# Patient Record
Sex: MALE | Race: WHITE | NOT HISPANIC OR LATINO | Employment: UNEMPLOYED | URBAN - METROPOLITAN AREA
[De-identification: names, ages, dates, MRNs, and addresses within clinical notes are randomized per-mention and may not be internally consistent; named-entity substitution may affect disease eponyms.]

---

## 2017-05-03 ENCOUNTER — HOSPITAL ENCOUNTER (INPATIENT)
Facility: HOSPITAL | Age: 32
LOS: 1 days | Discharge: HOME | DRG: 897 | End: 2017-05-05
Attending: EMERGENCY MEDICINE | Admitting: HOSPITALIST
Payer: MEDICAID

## 2017-05-03 ENCOUNTER — NURSE TRIAGE (OUTPATIENT)
Dept: ADMINISTRATIVE | Facility: CLINIC | Age: 32
End: 2017-05-03

## 2017-05-03 DIAGNOSIS — F10.930 ALCOHOL WITHDRAWAL, UNCOMPLICATED: Primary | ICD-10-CM

## 2017-05-03 LAB
ALBUMIN SERPL BCP-MCNC: 4.7 G/DL
ALP SERPL-CCNC: 82 U/L
ALT SERPL W/O P-5'-P-CCNC: 241 U/L
AMPHET+METHAMPHET UR QL: NEGATIVE
ANION GAP SERPL CALC-SCNC: 16 MMOL/L
APAP SERPL-MCNC: <3 UG/ML
AST SERPL-CCNC: 166 U/L
BARBITURATES UR QL SCN>200 NG/ML: NEGATIVE
BASOPHILS # BLD AUTO: 0.03 K/UL
BASOPHILS NFR BLD: 0.6 %
BENZODIAZ UR QL SCN>200 NG/ML: NEGATIVE
BILIRUB SERPL-MCNC: 0.5 MG/DL
BILIRUB UR QL STRIP: NEGATIVE
BUN SERPL-MCNC: 16 MG/DL
BZE UR QL SCN: NEGATIVE
CALCIUM SERPL-MCNC: 9.5 MG/DL
CANNABINOIDS UR QL SCN: NEGATIVE
CHLORIDE SERPL-SCNC: 102 MMOL/L
CLARITY UR: CLEAR
CO2 SERPL-SCNC: 20 MMOL/L
COLOR UR: YELLOW
CREAT SERPL-MCNC: 0.8 MG/DL
CREAT UR-MCNC: 43.7 MG/DL
DIFFERENTIAL METHOD: ABNORMAL
EOSINOPHIL # BLD AUTO: 0.2 K/UL
EOSINOPHIL NFR BLD: 3.6 %
ERYTHROCYTE [DISTWIDTH] IN BLOOD BY AUTOMATED COUNT: 12.4 %
EST. GFR  (AFRICAN AMERICAN): >60 ML/MIN/1.73 M^2
EST. GFR  (NON AFRICAN AMERICAN): >60 ML/MIN/1.73 M^2
ETHANOL SERPL-MCNC: 204 MG/DL
GLUCOSE SERPL-MCNC: 87 MG/DL
GLUCOSE UR QL STRIP: NEGATIVE
HCT VFR BLD AUTO: 44.7 %
HGB BLD-MCNC: 15.8 G/DL
HGB UR QL STRIP: NEGATIVE
KETONES UR QL STRIP: NEGATIVE
LEUKOCYTE ESTERASE UR QL STRIP: NEGATIVE
LYMPHOCYTES # BLD AUTO: 1.9 K/UL
LYMPHOCYTES NFR BLD: 37.4 %
MAGNESIUM SERPL-MCNC: 2.6 MG/DL
MCH RBC QN AUTO: 33.5 PG
MCHC RBC AUTO-ENTMCNC: 35.3 %
MCV RBC AUTO: 95 FL
METHADONE UR QL SCN>300 NG/ML: NEGATIVE
MONOCYTES # BLD AUTO: 0.6 K/UL
MONOCYTES NFR BLD: 11.7 %
NEUTROPHILS # BLD AUTO: 2.4 K/UL
NEUTROPHILS NFR BLD: 46.5 %
NITRITE UR QL STRIP: NEGATIVE
OPIATES UR QL SCN: NEGATIVE
PCP UR QL SCN>25 NG/ML: NEGATIVE
PH UR STRIP: 6 [PH] (ref 5–8)
PLATELET # BLD AUTO: 211 K/UL
PMV BLD AUTO: 9.3 FL
POTASSIUM SERPL-SCNC: 4.3 MMOL/L
PROT SERPL-MCNC: 8 G/DL
PROT UR QL STRIP: NEGATIVE
RBC # BLD AUTO: 4.71 M/UL
SODIUM SERPL-SCNC: 138 MMOL/L
SP GR UR STRIP: 1.01 (ref 1–1.03)
TOXICOLOGY INFORMATION: NORMAL
TSH SERPL DL<=0.005 MIU/L-ACNC: 2.13 UIU/ML
URN SPEC COLLECT METH UR: NORMAL
UROBILINOGEN UR STRIP-ACNC: NEGATIVE EU/DL
WBC # BLD AUTO: 5.05 K/UL

## 2017-05-03 PROCEDURE — 12000002 HC ACUTE/MED SURGE SEMI-PRIVATE ROOM

## 2017-05-03 PROCEDURE — 80053 COMPREHEN METABOLIC PANEL: CPT

## 2017-05-03 PROCEDURE — 63600175 PHARM REV CODE 636 W HCPCS: Performed by: EMERGENCY MEDICINE

## 2017-05-03 PROCEDURE — 99284 EMERGENCY DEPT VISIT MOD MDM: CPT | Mod: 25

## 2017-05-03 PROCEDURE — 96374 THER/PROPH/DIAG INJ IV PUSH: CPT

## 2017-05-03 PROCEDURE — 81003 URINALYSIS AUTO W/O SCOPE: CPT

## 2017-05-03 PROCEDURE — 84443 ASSAY THYROID STIM HORMONE: CPT

## 2017-05-03 PROCEDURE — 80329 ANALGESICS NON-OPIOID 1 OR 2: CPT

## 2017-05-03 PROCEDURE — 96361 HYDRATE IV INFUSION ADD-ON: CPT

## 2017-05-03 PROCEDURE — 83735 ASSAY OF MAGNESIUM: CPT

## 2017-05-03 PROCEDURE — 85025 COMPLETE CBC W/AUTO DIFF WBC: CPT

## 2017-05-03 PROCEDURE — 25000003 PHARM REV CODE 250: Performed by: EMERGENCY MEDICINE

## 2017-05-03 PROCEDURE — 80320 DRUG SCREEN QUANTALCOHOLS: CPT

## 2017-05-03 PROCEDURE — 82570 ASSAY OF URINE CREATININE: CPT

## 2017-05-03 RX ORDER — DIAZEPAM 10 MG/2ML
10 INJECTION INTRAMUSCULAR
Status: COMPLETED | OUTPATIENT
Start: 2017-05-03 | End: 2017-05-04

## 2017-05-03 RX ORDER — LORAZEPAM 0.5 MG/1
2 TABLET ORAL EVERY 4 HOURS PRN
Status: DISCONTINUED | OUTPATIENT
Start: 2017-05-03 | End: 2017-05-04

## 2017-05-03 RX ADMIN — FOLIC ACID: 5 INJECTION, SOLUTION INTRAMUSCULAR; INTRAVENOUS; SUBCUTANEOUS at 09:05

## 2017-05-03 NOTE — TELEPHONE ENCOUNTER
Reason for Disposition   Feeling very shaky (i.e., visible tremors of hands)    Protocols used: ST ALCOHOL USE AND ABUSE AND [id number]    Brett drinks alcohol daily and recently has been having physical withdrawal symptoms. He states he has hand tremors when not drinking, but today he drank some to reduce the tremors and is still having those symptoms. He wants help and is asking can he seek help by going to the ED. Advised going to the emergency room is the appropriate recommendation, and that there are medications that can reduce withdrawal symptoms. Advised to have someone else bring him into ED since he does admit to drinking today.

## 2017-05-03 NOTE — IP AVS SNAPSHOT
[place] [address] [place] [address]  Phone: [phone]           Patient Discharge Instructions   Our goal is to set you up for success. This packet includes information on your condition, medications, and your home care.  It will help you care for yourself to prevent having to return to the hospital.     Please ask your nurse if you have any questions.      There are many details to remember when preparing to leave the hospital. Here is what you will need to do:    1. Take your medicine. If you are prescribed medications, review your Medication List on the following pages. You may have new medications to  at the pharmacy and others that you'll need to stop taking. Review the instructions for how and when to take your medications. Talk with your doctor or nurses if you are unsure of what to do.     2. Go to your follow-up appointments. Specific follow-up information is listed in the following pages. Your may be contacted by a nurse or clinical provider about future appointments. Be sure we have all of the phone numbers to reach you. Please contact your provider's office if you are unable to make an appointment.     3. Watch for warning signs. Your doctor or nurse will give you detailed warning signs to watch for and when to call for assistance. These instructions may also include educational information about your condition. If you experience any of warning signs to your health, call your doctor.           [facility]  Unless otherwise directed by your provider, please   contact [facility], our nurse care line   that is available for 24/7 assistance.     [phone] (toll-free)     Registered nurses in the [facility]   provide: appointment scheduling, clinical advisement, health education, and other advisory services.                  ** Verify the list of medication(s) below is accurate and up to date. Carry this with you in case of emergency.  If your medications have changed, please notify your healthcare provider.             Medication List      START taking these medications        Additional Info                      diazePAM 10 MG Tab   Commonly known as:  VALIUM   Quantity:  16 tablet   Refills:  0    Last time this was given:  10 mg on 5/5/2017  2:22 PM   Instructions:  Take 1 tab tid for 2 days, then 1 tab bid for 3 days, then 1 tab daily for 3 days, then 1/2 tab for 2 days, then stop.     Begin Date    AM    Noon    PM    Bedtime       ondansetron 8 MG Tbdl   Commonly known as:  ZOFRAN-ODT   Quantity:  15 tablet   Refills:  0   Dose:  8 mg    Instructions:  Take 1 tablet (8 mg total) by mouth every 12 (twelve) hours as needed.     Begin Date    AM    Noon    PM    Bedtime       tramadol 50 mg tablet   Commonly known as:  ULTRAM   Quantity:  15 tablet   Refills:  0   Dose:  50 mg    Last time this was given:  50 mg on 5/5/2017 12:13 PM   Instructions:  Take 1 tablet (50 mg total) by mouth every 6 (six) hours as needed.     Begin Date    AM    Noon    PM    Bedtime            Where to Get Your Medications      You can get these medications from any pharmacy     Bring a paper prescription for each of these medications     diazePAM 10 MG Tab    ondansetron 8 MG Tbdl    tramadol 50 mg tablet                  Please bring to all follow up appointments:    1. A copy of your discharge instructions.  2. All medicines you are currently taking in their original bottles.  3. Identification and insurance card.    Please arrive 15 minutes ahead of scheduled appointment time.    Please call 24 hours in advance if you must reschedule your appointment and/or time.        Your Scheduled Appointments     May 11, 2017  4:00 PM [facility]   Hospital Follow Up with [facility] ([facility])    [address] [facility] [address]  [facility] [address] [phone]                Discharge Instructions     Future Orders    Activity as  "tolerated     Call MD for:  difficulty breathing or increased cough     Call MD for:  increased confusion or weakness     Call MD for:  persistent dizziness, light-headedness, or visual disturbances     Call MD for:  persistent nausea and vomiting or diarrhea     Call MD for:  temperature >100.4     Diet general     Questions:    Total calories:      Fat restriction, if any:      Protein restriction, if any:      Na restriction, if any:      Fluid restriction:      Additional restrictions:          Discharge Instructions           Alcohol Abuse  Alcoholic drinks are harmful when you have too many of them. There is no set number of drinks that defines too much. Drinking that disrupts your life or your health is called alcohol abuse. Alcohol abuse can hurt your relationships with others. You may lose friends, a spouse, or even your job. You may be abusing alcohol if any of the following are true for you:  · Duties at home or with  suffer because of drinking.  · Duties at work or in school suffer because of drinking.  · You have missed work or school because of drinking.  · You use alcohol while driving or operating machinery.  · You have legal problems such as arrests due to drinking.  · You keep drinking even though it causes serious problems in your life.  Health effects  Alcohol abuse causes health problems. Sometimes this can happen after only drinking a little." There is no set number of drinks or amount of alcohol that defines too much. The more you drink at one time, and the more often you drink determine both the short-term and long-term health effects. It affects all parts of your body and your health, including your:  · Brain. Alcohol is a central nervous system depressant. It can damage parts of the brain that affect your balance, memory, thinking, and emotions. It can cause memory loss, blackouts, depression, agitation, sleep cycle changes, and seizures. These changes may or may not be " reversible.  · Heart and vascular system. Alcohol affects multiple areas. It can damage heart muscle causing cardiomyopathy, which is a weakening and stretching of the heart muscle. This can lead to trouble breathing, an irregular heartbeat, atrial fibrillation, leg swelling, and heart failure. Alcohol use makes the blood vessels stiffen causing high blood pressure. All of these problems increase your risk of having heart attacks or strokes.  · Liver. Alcohol causes fat to build up in the liver, affecting its normal function. This increases the risk for hepatitis, leading to abdominal pain, appetite loss, jaundice, bleeding problems, liver fibrosis, and cirrhosis. This, in turn, can affect your ability to fight off infections, and can cause diabetes. The liver changes prevent it from removing toxins in your blood that can cause encephalopathy, which may show with confusion, altered level of consciousness, personality changes, memory loss, seizures, coma, and death.  · Pancreas. Alcohol can cause inflammation of the pancreas, or pancreatitis. This can cause abdominal pain, fever, and diabetes.  · Immune system. Alcohol weakens your immune system in a number of ways. It suppresses your immune system making it harder to fight infections and colds. It also increases the chance of getting pneumonia and tuberculosis.  · Cancer. Alcohol is a risk factor for developing cancer of the mouth, esophagus, pharynx, larynx, liver, and breast.  · Sexual function. Alcohol can lead to sexual problems.  Home care  The following guidelines will help you deal with alcohol abuse:  · Admit you have a problem with alcohol.  · Ask for help from your healthcare provider and trusted family members or close friends.  · Get help from people trained in dealing with alcohol abuse. This may be individual counseling or group therapy, or it may be a supervised alcohol treatment program.  · Join a self-help group for alcohol abuse such as Alcoholics  "Anonymous (AA).  · Avoid people who abuse alcohol or tempt you to drink.  Follow-up care  Follow up as advised by the healthcare provider, or as advised. Contact these groups to get help:  · Alcoholics Anonymous (AA): Go to www.aa.org or check the phone book for meetings near you.  · National Alcohol and Substance Abuse Information Center (NASAIC): 231.713.8933 www.addictioncareGratafy.[employer]  · National [place] on Alcoholism and Drug Dependence (NCADD): 920-KKW-AMWG (180-8689) www.ncadd.org  Call 911  Call 911 if any of these occur:  · Trouble breathing or slow irregular breathing  · Chest pain  · Sudden weakness on one side of your body or sudden trouble speaking  · Heavy bleeding or vomiting blood  · Very drowsy or trouble awakening  · Fainting or loss of consciousness  · Rapid heart rate  · Seizure  When to seek medical care  Call your healthcare provider right away if any of these occur:   · Confusion  · Hallucinations (seeing, hearing, or feeling things that arent there)  · Pain in your upper abdomen that gets worse  · Repeated vomiting or black or tarry stools  · Severe shakiness  Date Last Reviewed: 6/1/2016  © 4927-3753 [employer]. [address] [place] [address]. All rights reserved. This information is not intended as a substitute for professional medical care. Always follow your healthcare professional's instructions.            Primary Diagnosis     Your primary diagnosis was:  Alcohol Withdrawal      Admission Information     Date & Time Provider Department CSN    5/3/2017  8:54 PM Mono Vizcarra MD [facility] [id number]      Care Providers     Provider Role Specialty Primary office phone    Mono Vizcarra MD Attending Provider Hospitalist [phone]      Your Vitals Were     BP Pulse Temp Resp Height Weight    129/62 (BP Location: Right arm, Patient Position: Lying, BP Method: Automatic) 93 98.1 °F (36.7 °C) (Oral) 17 6' 2" (1.88 m) 84.1 kg (185 lb 8 oz) "    SpO2 BMI             100% 23.82 kg/m2         Recent Lab Values     No lab values to display.      Allergies as of 5/5/2017        Reactions    Ciprofloxacin       Advance Directives     An advance directive is a document which, in the event you are no longer able to make decisions for yourself, tells your healthcare team what kind of treatment you do or do not want to receive, or who you would like to make those decisions for you.  If you do not currently have an advance directive, [facility] encourages you to create one.  For more information call:  [phone] ([phone]), [phone] ([phone]),  or log on to [email or web address].        Smoking Cessation     If you would like to quit smoking:   You may be eligible for free services if you are a [place] resident and started smoking cigarettes before September 1, 1988.  Call the Smoking Cessation Trust ([facility]) toll free at [phone] [phone] or [phone] 162-9175.   Call -458-QUIT-NOW if you do not meet the above criteria.   Contact us via email: [email or web address]   View our website for more information: [email or web address]        Language Assistance Services     ATTENTION: Language assistance services are available, free of charge. Please call [phone].      ATENCIÓN: Si habla español, tiene a barnes disposición servicios gratuitos de asistencia lingüística. Llame al [phone].     CHÚ Ý: N?u b?n nói Ti?ng Vi?t, có các d?ch v? h? tr? ngôn ng? mi?n phí dành cho b?n. G?i s? [phone].        [facility] Sign-Up     Activating your [facility] account is as easy as 1-2-3!     1) Visit [email or web address], select Sign Up Now, enter this activation code and your date of birth, then select Next.  [email or web address]  Expires: 6/19/2017  2:31 PM      2) Create a username and password to use when you visit [facility] in the future and select a security question in case you lose your password and select Next.    3) Enter your e-mail address and  click Sign Up!    Additional Information  If you have questions, please e-mail [email or web address] or call [phone] to talk to our [facility] staff. Remember, [facility] is NOT to be used for urgent needs. For medical emergencies, dial 911.          [facility] complies with applicable Federal civil rights laws and does not discriminate on the basis of race, color, national origin, age, disability, or sex.

## 2017-05-04 PROBLEM — F10.939 ALCOHOL WITHDRAWAL: Status: ACTIVE | Noted: 2017-05-04

## 2017-05-04 PROBLEM — F17.200 SMOKER: Chronic | Status: ACTIVE | Noted: 2017-05-04

## 2017-05-04 PROBLEM — F10.10 ALCOHOL ABUSE: Chronic | Status: ACTIVE | Noted: 2017-05-04

## 2017-05-04 PROBLEM — R79.89 ELEVATED LFTS: Status: ACTIVE | Noted: 2017-05-04

## 2017-05-04 LAB
ANION GAP SERPL CALC-SCNC: 12 MMOL/L
BUN SERPL-MCNC: 16 MG/DL
CALCIUM SERPL-MCNC: 8.5 MG/DL
CHLORIDE SERPL-SCNC: 105 MMOL/L
CO2 SERPL-SCNC: 23 MMOL/L
CREAT SERPL-MCNC: 0.8 MG/DL
EST. GFR  (AFRICAN AMERICAN): >60 ML/MIN/1.73 M^2
EST. GFR  (NON AFRICAN AMERICAN): >60 ML/MIN/1.73 M^2
GLUCOSE SERPL-MCNC: 82 MG/DL
POTASSIUM SERPL-SCNC: 3.6 MMOL/L
SODIUM SERPL-SCNC: 140 MMOL/L

## 2017-05-04 PROCEDURE — 63600175 PHARM REV CODE 636 W HCPCS: Performed by: EMERGENCY MEDICINE

## 2017-05-04 PROCEDURE — 25000003 PHARM REV CODE 250: Performed by: HOSPITALIST

## 2017-05-04 PROCEDURE — 36415 COLL VENOUS BLD VENIPUNCTURE: CPT

## 2017-05-04 PROCEDURE — 12000002 HC ACUTE/MED SURGE SEMI-PRIVATE ROOM

## 2017-05-04 PROCEDURE — 25000003 PHARM REV CODE 250: Performed by: EMERGENCY MEDICINE

## 2017-05-04 PROCEDURE — 80048 BASIC METABOLIC PNL TOTAL CA: CPT

## 2017-05-04 PROCEDURE — 63600175 PHARM REV CODE 636 W HCPCS: Performed by: HOSPITALIST

## 2017-05-04 RX ORDER — LORAZEPAM 2 MG/ML
2 INJECTION INTRAMUSCULAR EVERY 4 HOURS PRN
Status: DISCONTINUED | OUTPATIENT
Start: 2017-05-04 | End: 2017-05-05 | Stop reason: HOSPADM

## 2017-05-04 RX ORDER — DEXTROSE MONOHYDRATE AND SODIUM CHLORIDE 5; .9 G/100ML; G/100ML
INJECTION, SOLUTION INTRAVENOUS CONTINUOUS
Status: DISCONTINUED | OUTPATIENT
Start: 2017-05-04 | End: 2017-05-05 | Stop reason: HOSPADM

## 2017-05-04 RX ORDER — ACETAMINOPHEN 325 MG/1
650 TABLET ORAL EVERY 6 HOURS PRN
Status: DISCONTINUED | OUTPATIENT
Start: 2017-05-04 | End: 2017-05-05 | Stop reason: HOSPADM

## 2017-05-04 RX ORDER — ONDANSETRON 2 MG/ML
4 INJECTION INTRAMUSCULAR; INTRAVENOUS EVERY 12 HOURS PRN
Status: DISCONTINUED | OUTPATIENT
Start: 2017-05-04 | End: 2017-05-04

## 2017-05-04 RX ORDER — DIAZEPAM 5 MG/1
10 TABLET ORAL EVERY 8 HOURS
Status: DISCONTINUED | OUTPATIENT
Start: 2017-05-04 | End: 2017-05-05 | Stop reason: HOSPADM

## 2017-05-04 RX ORDER — IBUPROFEN 200 MG
1 TABLET ORAL DAILY
Status: DISCONTINUED | OUTPATIENT
Start: 2017-05-04 | End: 2017-05-05 | Stop reason: HOSPADM

## 2017-05-04 RX ORDER — ONDANSETRON 2 MG/ML
8 INJECTION INTRAMUSCULAR; INTRAVENOUS EVERY 8 HOURS PRN
Status: DISCONTINUED | OUTPATIENT
Start: 2017-05-04 | End: 2017-05-05 | Stop reason: HOSPADM

## 2017-05-04 RX ADMIN — ONDANSETRON 4 MG: 2 INJECTION INTRAMUSCULAR; INTRAVENOUS at 08:05

## 2017-05-04 RX ADMIN — DEXTROSE AND SODIUM CHLORIDE: 5; .9 INJECTION, SOLUTION INTRAVENOUS at 09:05

## 2017-05-04 RX ADMIN — LORAZEPAM 2 MG: 2 INJECTION INTRAMUSCULAR; INTRAVENOUS at 04:05

## 2017-05-04 RX ADMIN — DEXTROSE AND SODIUM CHLORIDE 1000 ML: 5; .9 INJECTION, SOLUTION INTRAVENOUS at 12:05

## 2017-05-04 RX ADMIN — DIAZEPAM 10 MG: 5 TABLET ORAL at 09:05

## 2017-05-04 RX ADMIN — DIAZEPAM 10 MG: 5 TABLET ORAL at 05:05

## 2017-05-04 RX ADMIN — DIAZEPAM 10 MG: 5 INJECTION, SOLUTION INTRAMUSCULAR; INTRAVENOUS at 12:05

## 2017-05-04 RX ADMIN — NICOTINE 1 PATCH: 21 PATCH, EXTENDED RELEASE TRANSDERMAL at 01:05

## 2017-05-04 RX ADMIN — FOLIC ACID: 5 INJECTION, SOLUTION INTRAMUSCULAR; INTRAVENOUS; SUBCUTANEOUS at 10:05

## 2017-05-04 RX ADMIN — DIAZEPAM 10 MG: 5 TABLET ORAL at 01:05

## 2017-05-04 RX ADMIN — LORAZEPAM 2 MG: 2 INJECTION INTRAMUSCULAR; INTRAVENOUS at 09:05

## 2017-05-04 RX ADMIN — ACETAMINOPHEN 650 MG: 325 TABLET, FILM COATED ORAL at 04:05

## 2017-05-04 NOTE — ED TRIAGE NOTES
Pt presents to Ed with c/o shaking and stating he is trying to detox and going through withdrawals . Pt also c/o nausea and chills.

## 2017-05-04 NOTE — ASSESSMENT & PLAN NOTE
Patient very interested in alcohol cessation and wants to pursue inpatient treatment.  SW consult.

## 2017-05-04 NOTE — H&P
"[facility] Hospital Medicine  History & Physical    Patient Name: Beau Leahy  MRN: [id number]  Admission Date: 5/3/2017  Attending Physician: Mono Vizcarra MD   Primary Care Provider: Primary Doctor No         Patient information was obtained from patient.     Subjective:     Principal Problem:Alcohol withdrawal    Chief Complaint:   Chief Complaint   Patient presents with    Delirium Tremens (DTS)     pt reports last drink 1 hour ago, "now shaking and wants inpatient help"        HPI: 30 y/o male with alcohol abuse presented to ER for alcohol detox.  Patient drinks whiskey and beers from the time he wakes up to when he goes to sleep.  Patient states that he wants to go into an inpatient detox program.  He apparently called an [facility] nurse and was told to come to ER for treatment.  Patient states that he knows how severe ETOH withdrawal could be, but he has never gone into withdrawal.  Patient stated that he felt shaky prior to presentation.  He also complains of constant nausea, but no vomiting.  Patient's last drink was around 15 hours ago.  Pt is seeking inpatient help and has contacted a few programs already.  He was in a inpatient program around 2 years ago.  Pt states, "I don't want to detox alone and I don't want to drink alcohol before I get into a program".  Pt denies any drug use.  No other complaints.    Past Medical History:   Diagnosis Date    Alcohol abuse        Past Surgical History:   Procedure Laterality Date    LEG SURGERY      TONSILLECTOMY         Review of patient's allergies indicates:   Allergen Reactions    Ciprofloxacin        No current facility-administered medications on file prior to encounter.      No current outpatient prescriptions on file prior to encounter.     Family History     Problem Relation (Age of Onset)    Arthritis Sister    Birth defects Sister    Cancer Mother        Social History Main Topics    Smoking status: Current Every Day " Smoker     Packs/day: 0.50     Years: 0.50    Smokeless tobacco: Not on file      Comment: pt in for etoh affraid it will over stimulate    Alcohol use 7.2 oz/week     12 Cans of beer per week      Comment: a fifth a day plus    Drug use: No    Sexual activity: Yes     Partners: Female     Review of Systems   Constitutional: Negative for fever and unexpected weight change.   HENT: Negative for ear discharge and ear pain.    Eyes: Negative for pain and itching.   Respiratory: Negative for cough and shortness of breath.    Cardiovascular: Negative for chest pain and palpitations.   Gastrointestinal: Positive for nausea. Negative for abdominal distention.   Endocrine: Negative for polyphagia and polyuria.   Genitourinary: Negative for difficulty urinating and dysuria.   Neurological: Positive for tremors. Negative for seizures.   Psychiatric/Behavioral: Negative for hallucinations. The patient is nervous/anxious.      Objective:     Vital Signs (Most Recent):  Temp: 98.1 °F (36.7 °C) (05/04/17 1255)  Pulse: 90 (05/04/17 1255)  Resp: 17 (05/04/17 1255)  BP: (!) 129/94 (05/04/17 1255)  SpO2: 100 % (05/04/17 1255) Vital Signs (24h Range):  Temp:  [98.1 °F (36.7 °C)-98.8 °F (37.1 °C)] 98.1 °F (36.7 °C)  Pulse:  [] 90  Resp:  [17-20] 17  SpO2:  [96 %-100 %] 100 %  BP: (121-138)/(71-94) 129/94     Weight: 84.1 kg (185 lb 8 oz)  Body mass index is 23.82 kg/(m^2).    Physical Exam   Constitutional: He is oriented to person, place, and time. He appears well-developed. No distress.   HENT:   Head: Normocephalic and atraumatic.   Eyes: Conjunctivae are normal. Right eye exhibits no discharge. Left eye exhibits no discharge.   Neck: Neck supple.   Cardiovascular: Normal rate, regular rhythm and normal heart sounds.    Pulmonary/Chest: Effort normal and breath sounds normal. No stridor.   Abdominal: Soft. Bowel sounds are normal.   Musculoskeletal: Normal range of motion. He exhibits no deformity.   Neurological: He is  alert and oriented to person, place, and time.   Skin: Skin is warm and dry.        Significant Labs:   CBC:   Recent Labs  Lab 05/03/17 2140   WBC 5.05   HGB 15.8   HCT 44.7        CMP:   Recent Labs  Lab 05/03/17 2140 05/04/17  0415    140   K 4.3 3.6    105   CO2 20* 23   GLU 87 82   BUN 16 16   CREATININE 0.8 0.8   CALCIUM 9.5 8.5*   PROT 8.0  --    ALBUMIN 4.7  --    BILITOT 0.5  --    ALKPHOS 82  --    *  --    *  --    ANIONGAP 16 12   EGFRNONAA >60 >60           Assessment/Plan:     * Alcohol withdrawal  Patient presented with slight tremors and slight tachycardia.  Currently looks comfortable with no obvious signs of DT's.  Hemodynamically stable.  Continue scheduled Valium with prn Ativan.  Daily Banana Bag.  Will discuss case with SW for disposition.      Alcohol abuse  Patient very interested in alcohol cessation and wants to pursue inpatient treatment.  SW consult.      Elevated LFTs  Secondary to ETOH abuse.  Rest of labs not consistent with active liver failure.      Smoker  Smoking cessation counseling.  Will place Nicotine patch.      VTE Risk Mitigation         Ordered     Low Risk of VTE  Once      05/04/17 0217        Mono Vizcarra MD  Department of Hospital Medicine   [facility]

## 2017-05-04 NOTE — PROGRESS NOTES
Patient with shaking noted. Complaints of pain all over especially in lower back. Ativan IV given.

## 2017-05-04 NOTE — PLAN OF CARE
"Problem: Patient Care Overview  Goal: Plan of Care Review  Outcome: Ongoing (interventions implemented as appropriate)  Patient has had shakes today. PO scheduled valium and prn ativan given and helped some with symptoms. Complaints of "all over" pain. Tylenol given. Patient had yellow bag of fluids and continues on IV fluids. Appetite poor, patient having nausea that is relieved with zofran.      "

## 2017-05-04 NOTE — SUBJECTIVE & OBJECTIVE
Past Medical History:   Diagnosis Date    Alcohol abuse        Past Surgical History:   Procedure Laterality Date    LEG SURGERY      TONSILLECTOMY         Review of patient's allergies indicates:   Allergen Reactions    Ciprofloxacin        No current facility-administered medications on file prior to encounter.      No current outpatient prescriptions on file prior to encounter.     Family History     Problem Relation (Age of Onset)    Arthritis Sister    Birth defects Sister    Cancer Mother        Social History Main Topics    Smoking status: Current Every Day Smoker     Packs/day: 0.50     Years: 0.50    Smokeless tobacco: Not on file      Comment: pt in for etoh affraid it will over stimulate    Alcohol use 7.2 oz/week     12 Cans of beer per week      Comment: a fifth a day plus    Drug use: No    Sexual activity: Yes     Partners: Female     Review of Systems   Constitutional: Negative for fever and unexpected weight change.   HENT: Negative for ear discharge and ear pain.    Eyes: Negative for pain and itching.   Respiratory: Negative for cough and shortness of breath.    Cardiovascular: Negative for chest pain and palpitations.   Gastrointestinal: Positive for nausea. Negative for abdominal distention.   Endocrine: Negative for polyphagia and polyuria.   Genitourinary: Negative for difficulty urinating and dysuria.   Neurological: Positive for tremors. Negative for seizures.   Psychiatric/Behavioral: Negative for hallucinations. The patient is nervous/anxious.      Objective:     Vital Signs (Most Recent):  Temp: 98.1 °F (36.7 °C) (05/04/17 1255)  Pulse: 90 (05/04/17 1255)  Resp: 17 (05/04/17 1255)  BP: (!) 129/94 (05/04/17 1255)  SpO2: 100 % (05/04/17 1255) Vital Signs (24h Range):  Temp:  [98.1 °F (36.7 °C)-98.8 °F (37.1 °C)] 98.1 °F (36.7 °C)  Pulse:  [] 90  Resp:  [17-20] 17  SpO2:  [96 %-100 %] 100 %  BP: (121-138)/(71-94) 129/94     Weight: 84.1 kg (185 lb 8 oz)  Body mass index is  23.82 kg/(m^2).    Physical Exam   Constitutional: He is oriented to person, place, and time. He appears well-developed. No distress.   HENT:   Head: Normocephalic and atraumatic.   Eyes: Conjunctivae are normal. Right eye exhibits no discharge. Left eye exhibits no discharge.   Neck: Neck supple.   Cardiovascular: Normal rate, regular rhythm and normal heart sounds.    Pulmonary/Chest: Effort normal and breath sounds normal. No stridor.   Abdominal: Soft. Bowel sounds are normal.   Musculoskeletal: Normal range of motion. He exhibits no deformity.   Neurological: He is alert and oriented to person, place, and time.   Skin: Skin is warm and dry.        Significant Labs:   CBC:   Recent Labs  Lab 05/03/17  2140   WBC 5.05   HGB 15.8   HCT 44.7        CMP:   Recent Labs  Lab 05/03/17  2140 05/04/17  0415    140   K 4.3 3.6    105   CO2 20* 23   GLU 87 82   BUN 16 16   CREATININE 0.8 0.8   CALCIUM 9.5 8.5*   PROT 8.0  --    ALBUMIN 4.7  --    BILITOT 0.5  --    ALKPHOS 82  --    *  --    *  --    ANIONGAP 16 12   EGFRNONAA >60 >60

## 2017-05-04 NOTE — ASSESSMENT & PLAN NOTE
Patient presented with slight tremors and slight tachycardia.  Currently looks comfortable with no obvious signs of DT's.  Hemodynamically stable.  Continue scheduled Valium with prn Ativan.  Daily Banana Bag.  Will discuss case with SW for disposition.

## 2017-05-04 NOTE — PLAN OF CARE
"SW met with patient to complete discharge needs assessment. SW provided and reviewed with patient "[facility] Packet", "Discharge Planning Begins on Admission" brochure and "Help At Home" handout. SW discussed with patient going to residential substance abuse treatment when discharged from the hospital. Patient informed SW he's went to treatment with [facility] in the past. Patient reports he's been in contact with a few treatment facilities in [facility]: [facility], [place], [facility], and [facility]. Patient reports that he's still thinking about where he would like to go. SW informed patient if he need any assistance with the admission process or clinicals sent on his behalf to contact RITA.  RITA also discussed patient following up with [facility]. SW explained the benefits and advantages of going to [facility], patient in agreement with following up. Patient prefers afternoon doctor appointments.      [employer] [id number] [place] [address] [place] - [address] [facility]  [address]  [facility] [address]  Phone: [phone] Fax: [phone]         05/04/17 1517   Discharge Assessment   Assessment Type Discharge Planning Assessment   Confirmed/corrected address and phone number on facesheet? Yes   Assessment information obtained from? Patient   Type of Healthcare Directive Received (None)   Prior to hospitilization cognitive status: Alert/Oriented;No Deficits   Prior to hospitalization functional status: Independent   Current cognitive status: Alert/Oriented;No Deficits   Current Functional Status: Independent   Arrived From home or self-care   Lives With alone   Able to Return to Prior Arrangements yes   Is patient able to care for self after discharge? Yes   How many people do you have in your home that can help with your care after discharge? 0   Who are your caregiver(s) and their phone number(s)? "I don't have no one to help me, " "no family or friends".   Patient's perception of discharge disposition other (comments)  (Residential Substance Abuse Treatment)   Readmission Within The Last 30 Days no previous admission in last 30 days   Patient currently being followed by outpatient case management? No   Patient currently receives home health services? No   Does the patient currently use HME? No   Patient currently receives private duty nursing? No   Patient currently receives any other outside agency services? No   Equipment Currently Used at Home none   Do you have any problems affording any of your prescribed medications? No   Do you have any financial concerns preventing you from receiving the healthcare you need? No   Does the patient have transportation to healthcare appointments? Yes   Transportation Available car  (Patient reports car is in the hospital parking lot)   Does the patient receive services at the [facility]? No   Are there any open cases? No   Discharge Plan A Other  (Inpatient Residential Treatment)   Discharge Plan B Home   Patient/Family In Agreement With Plan yes     "

## 2017-05-04 NOTE — ED PROVIDER NOTES
"Encounter Date: 5/3/2017    SCRIBE #1 NOTE: I, Epifanio Encarnacionuyen , am scribing for, and in the presence of,  Galileo Latif MD . I have scribed the following portions of the note - Other sections scribed: HPI, ROS .       History     Chief Complaint   Patient presents with    Delirium Tremens (DTS)     pt reports last drink 1 hour ago, "now shaking and wants inpatient help"     Review of patient's allergies indicates:   Allergen Reactions    Ciprofloxacin      HPI Comments: CC: Delirium Tremens     HPI: This 31 y.o. Male smoker presents to the ED c/o chills, coughs, blood in stool, nausea, vomiting, hematuria, lower back pain, and generalized muscles spasms attributed to alcohol withdrawal derlirium. Symptoms are acute in onset. Pt reports last drink was 1 hour ago, typically drinks whiskey and beer. Pt states, "I know my withdrawal symptoms will be worse in the morning and I do not want to go through it alone." Pt is seeking inpatient help and has contacted a few programs already. Pt states, "I want to get into an inpatient program and I've spoken to a few. I don't want to detox alone and I don't want to drink alcohol before I get into a program." Pt denies any daily medication. Pt denies any drug use. Pt denies fever, eye pain, shortness of breath, abdominal pain, leg pain, arm pain, vomiting, dysuria, rash, headaches, or any other associated symptoms. Pt has no modifying factors. Pt has no prior treatment.         The history is provided by the patient. No  was used.     Past Medical History:   Diagnosis Date    Alcohol abuse      Past Surgical History:   Procedure Laterality Date    LEG SURGERY      TONSILLECTOMY       Family History   Problem Relation Age of Onset    Cancer Mother     Arthritis Sister     Birth defects Sister      Social History   Substance Use Topics    Smoking status: Current Every Day Smoker     Packs/day: 0.50     Years: 0.50    Smokeless tobacco: None      " Comment: pt in for etoh affraid it will over stimulate    Alcohol use 7.2 oz/week     12 Cans of beer per week      Comment: a fifth a day plus     Review of Systems   Constitutional: Positive for chills. Negative for fever.        (+) generalized muscle spasms    HENT: Negative for sore throat.    Eyes: Negative for pain.   Respiratory: Positive for cough. Negative for shortness of breath.    Cardiovascular: Negative for chest pain.   Gastrointestinal: Positive for blood in stool, diarrhea and nausea. Negative for abdominal pain and vomiting.   Genitourinary: Positive for hematuria. Negative for dysuria.   Musculoskeletal: Positive for back pain (lower). Negative for myalgias (legs, arms).   Skin: Negative for rash.   Neurological: Negative for weakness.       Physical Exam   Initial Vitals   BP Pulse Resp Temp SpO2   05/03/17 2048 05/03/17 2048 05/03/17 2048 05/03/17 2048 05/03/17 2048   138/76 111 20 98.1 °F (36.7 °C) 98 %     Physical Exam  The patient was examined specifically for the following:   General:No significant distress, Good color, Warm and dry. Head and neck:Scalp atraumatic, Neck supple. Neurological:Appropriate conversation, Gross motor deficits. Eyes:Conjugate gaze, Clear corneas. ENT: No epistaxis. Cardiac: Regular rate and rhythm, Grossly normal heart tones. Pulmonary: Wheezing, Rales. Gastrointestinal: Abdominal tenderness, Abdominal distention. Musculoskeletal: Extremity deformity, Apparent pain with range of motion of the joints. Skin: Rash.   The findings on examination were normal except for the following: The patient is tremulous tachycardic.  Lungs are clear.  Wheezing rales of the rhonchi.  Heart tones are normal.  The patient is regular rate and rhythm.  The abdomen is nontender.  There is no guarding rebound mass or distention.  Extremities are nontender.  There is no pain with range of motion of any joints.  The patient has no rash.  ED Course   Procedures  Labs Reviewed    COMPREHENSIVE METABOLIC PANEL - Abnormal; Notable for the following:        Result Value    CO2 20 (*)      (*)      (*)     All other components within normal limits   CBC W/ AUTO DIFFERENTIAL - Abnormal; Notable for the following:     MCH 33.5 (*)     All other components within normal limits   ALCOHOL,MEDICAL (ETHANOL) - Abnormal; Notable for the following:     Alcohol, Medical, Serum 204 (*)     All other components within normal limits   ACETAMINOPHEN LEVEL - Abnormal; Notable for the following:     Acetaminophen (Tylenol), Serum <3.0 (*)     All other components within normal limits   MAGNESIUM   TSH   URINALYSIS   DRUG SCREEN PANEL, URINE EMERGENCY         Medical decision-making: This patient presents to the emergency room with a history of alcoholism.  He is drinking a fifth of whiskey a day plus beer.  He would like to get off of alcohol.  He is tachycardic and tremulous.  I believe he is in early withdrawal.  He has been drinking today.  I discussed this case with Dr. Saeed we will admit him for benzodiazepine therapy.  There is no hypomagnesemia.  He has some mild liver enzyme elevations consistent with alcoholic liver disease.  His alcohol level was 204.  I expect she will get worse as his alcohol level falls.  I will treat him with benzodiazepines and admit him to internal medicine.  I collaborated on the orders with Dr. Saeed.                  Scribe Attestation:   Scribe #1: I performed the above scribed service and the documentation accurately describes the services I performed. I attest to the accuracy of the note.    Attending Attestation:           Physician Attestation for Scribe:  Physician Attestation Statement for Scribe #1: I, Galileo Latif MD , reviewed documentation, as scribed by Epifanio Gifford  in my presence, and it is both accurate and complete.                 ED Course     Clinical Impression:   The encounter diagnosis was Alcohol withdrawal, uncomplicated.           Galileo Latif MD  05/04/17 0414

## 2017-05-05 VITALS
HEART RATE: 93 BPM | DIASTOLIC BLOOD PRESSURE: 62 MMHG | HEIGHT: 74 IN | OXYGEN SATURATION: 100 % | WEIGHT: 185.5 LBS | BODY MASS INDEX: 23.81 KG/M2 | TEMPERATURE: 98 F | RESPIRATION RATE: 17 BRPM | SYSTOLIC BLOOD PRESSURE: 129 MMHG

## 2017-05-05 LAB
ANION GAP SERPL CALC-SCNC: 7 MMOL/L
BUN SERPL-MCNC: 12 MG/DL
CALCIUM SERPL-MCNC: 8.8 MG/DL
CHLORIDE SERPL-SCNC: 104 MMOL/L
CO2 SERPL-SCNC: 25 MMOL/L
CREAT SERPL-MCNC: 0.8 MG/DL
EST. GFR  (AFRICAN AMERICAN): >60 ML/MIN/1.73 M^2
EST. GFR  (NON AFRICAN AMERICAN): >60 ML/MIN/1.73 M^2
GLUCOSE SERPL-MCNC: 89 MG/DL
POTASSIUM SERPL-SCNC: 4 MMOL/L
SODIUM SERPL-SCNC: 136 MMOL/L

## 2017-05-05 PROCEDURE — 25000003 PHARM REV CODE 250: Performed by: EMERGENCY MEDICINE

## 2017-05-05 PROCEDURE — 80048 BASIC METABOLIC PNL TOTAL CA: CPT

## 2017-05-05 PROCEDURE — 36415 COLL VENOUS BLD VENIPUNCTURE: CPT

## 2017-05-05 PROCEDURE — 25000003 PHARM REV CODE 250: Performed by: HOSPITALIST

## 2017-05-05 PROCEDURE — 63600175 PHARM REV CODE 636 W HCPCS: Performed by: EMERGENCY MEDICINE

## 2017-05-05 PROCEDURE — 63600175 PHARM REV CODE 636 W HCPCS: Performed by: HOSPITALIST

## 2017-05-05 PROCEDURE — 94761 N-INVAS EAR/PLS OXIMETRY MLT: CPT

## 2017-05-05 RX ORDER — ONDANSETRON 8 MG/1
8 TABLET, ORALLY DISINTEGRATING ORAL EVERY 12 HOURS PRN
Qty: 15 TABLET | Refills: 0 | Status: SHIPPED | OUTPATIENT
Start: 2017-05-05

## 2017-05-05 RX ORDER — DIAZEPAM 10 MG/1
TABLET ORAL
Qty: 16 TABLET | Refills: 0 | Status: SHIPPED | OUTPATIENT
Start: 2017-05-05

## 2017-05-05 RX ORDER — DIAZEPAM 10 MG/1
TABLET ORAL
Qty: 16 TABLET | Refills: 0 | Status: SHIPPED | OUTPATIENT
Start: 2017-05-05 | End: 2017-05-05

## 2017-05-05 RX ORDER — ONDANSETRON 8 MG/1
8 TABLET, ORALLY DISINTEGRATING ORAL EVERY 12 HOURS PRN
Qty: 15 TABLET | Refills: 0 | Status: SHIPPED | OUTPATIENT
Start: 2017-05-05 | End: 2017-05-05

## 2017-05-05 RX ORDER — TRAMADOL HYDROCHLORIDE 50 MG/1
50 TABLET ORAL EVERY 6 HOURS PRN
Qty: 15 TABLET | Refills: 0 | Status: SHIPPED | OUTPATIENT
Start: 2017-05-05 | End: 2017-05-05

## 2017-05-05 RX ORDER — TRAMADOL HYDROCHLORIDE 50 MG/1
50 TABLET ORAL EVERY 6 HOURS PRN
Status: DISCONTINUED | OUTPATIENT
Start: 2017-05-05 | End: 2017-05-05 | Stop reason: HOSPADM

## 2017-05-05 RX ORDER — TRAMADOL HYDROCHLORIDE 50 MG/1
50 TABLET ORAL EVERY 6 HOURS PRN
Qty: 15 TABLET | Refills: 0 | Status: SHIPPED | OUTPATIENT
Start: 2017-05-05 | End: 2017-05-15

## 2017-05-05 RX ADMIN — TRAMADOL HYDROCHLORIDE 50 MG: 50 TABLET, FILM COATED ORAL at 12:05

## 2017-05-05 RX ADMIN — DEXTROSE AND SODIUM CHLORIDE: 5; .9 INJECTION, SOLUTION INTRAVENOUS at 05:05

## 2017-05-05 RX ADMIN — LORAZEPAM 2 MG: 2 INJECTION INTRAMUSCULAR; INTRAVENOUS at 01:05

## 2017-05-05 RX ADMIN — LORAZEPAM 2 MG: 2 INJECTION INTRAMUSCULAR; INTRAVENOUS at 08:05

## 2017-05-05 RX ADMIN — DIAZEPAM 10 MG: 5 TABLET ORAL at 05:05

## 2017-05-05 RX ADMIN — ACETAMINOPHEN 650 MG: 325 TABLET, FILM COATED ORAL at 08:05

## 2017-05-05 RX ADMIN — NICOTINE 1 PATCH: 21 PATCH, EXTENDED RELEASE TRANSDERMAL at 08:05

## 2017-05-05 RX ADMIN — DIAZEPAM 10 MG: 5 TABLET ORAL at 02:05

## 2017-05-05 RX ADMIN — ONDANSETRON 8 MG: 2 INJECTION INTRAMUSCULAR; INTRAVENOUS at 08:05

## 2017-05-05 RX ADMIN — FOLIC ACID: 5 INJECTION, SOLUTION INTRAMUSCULAR; INTRAVENOUS; SUBCUTANEOUS at 08:05

## 2017-05-05 NOTE — PLAN OF CARE
Problem: Patient Care Overview  Goal: Plan of Care Review  Outcome: Ongoing (interventions implemented as appropriate)  Patient received yellow bag of fluids this am and has been on IV fluids. Ativan x2 doses today for tremors and scheduled valium. V/s remain stable and no confusion noted. Patient to be discharged at 5pm today.

## 2017-05-05 NOTE — PROGRESS NOTES
Patient complains of back pain more severe than yesterday. MD ordered po pain medication. Not much relief noted. Patient concerned that the pain is like it was when he had a urinary blockage. States he is urinating without difficulty. Will make MD aware.

## 2017-05-05 NOTE — PLAN OF CARE
Patient referred to [facility] for hospital follow-up. RITA informed nurse Karina  completed all discharge planning for patient and whenever attending MD discharges patient nursing can complete their discharge.       05/05/17 1238   Final Note   Assessment Type Final Discharge Note   Discharge Disposition Home   Offered [facility] -- Bedside Delivery? n/a   Discharge/Hospital Encounter Summary to (non-[facility]) PCP n/a   Referral to Outpatient Case Management complete? n/a   Referral to / orders for Home Health Complete? n/a   30 day supply of medicines given at discharge, if documented non-compliance / non-adherence? n/a   Any social issues identified prior to discharge? n/a   Did you assess the readiness or willingness of the family or caregiver to support self management of care? n/a   Right Care Referral Info   Post Acute Recommendation No Care  ([facility] F/U)

## 2017-05-05 NOTE — PLAN OF CARE
Problem: Patient Care Overview  Goal: Plan of Care Review  Outcome: Ongoing (interventions implemented as appropriate)  Maribel Hinds, RN Registered Nurse Signed Med/Surg Nursing       xpand Allollapse All   [ ]Hide copied text  [ ]Hover for attribution information  Pt has remained free from falls and/or signs of infection this shift. Pt has remained within his comfort level as well. Will continue to monitor.

## 2017-05-05 NOTE — DISCHARGE SUMMARY
"[facility] Medicine  Discharge Summary      Patient Name: Beau Leahy  MRN: [id number]  Admission Date: 5/3/2017  Hospital Length of Stay: 1 days  Discharge Date and Time:  05/05/2017 1:47 PM  Attending Physician: Mono Vizcarra MD   Discharging Provider: Mono Vizcarra MD  Primary Care Provider: Primary Doctor No      HPI:   32 y/o male with alcohol abuse presented to ER for alcohol detox.  Patient drinks whiskey and beers from the time he wakes up to when he goes to sleep.  Patient states that he wants to go into an inpatient detox program.  He apparently called an [facility] nurse and was told to come to ER for treatment.  Patient states that he knows how severe ETOH withdrawal could be, but he has never gone into withdrawal.  Patient stated that he felt shaky prior to presentation.  He also complains of constant nausea, but no vomiting.  Patient's last drink was around 15 hours ago.  Pt is seeking inpatient help and has contacted a few programs already.  He was in a inpatient program around 2 years ago.  Pt states, "I don't want to detox alone and I don't want to drink alcohol before I get into a program".  Pt denies any drug use.  No other complaints.    * No surgery found *      Indwelling Lines/Drains at time of discharge:   Lines/Drains/Airways          No matching active lines, drains, or airways        Hospital Course:   32 y/o male presented to ER requesting alcohol detox.  Patient was noted to have slight tremors and slightly tachycardic.  He was admitted for ETOH withdrawal.  Patient was started on scheduled Valium with prn Ativan and daily Banana Bag.  He continued to have minor tremors, but remained hemodynamically stable.  No hypertension or tachycardia.  Patient did not have any evidence of hallucinations or seizures during hospital stay.  He also remained afebrile.  Patient will be discharged home on a Valium taper.  SW consulted and patient was given references " to inpatient alcohol programs.  He has already been in touch with several of this programs and he seems very interested in quitting alcohol.  He was counseled on importance of ETOH cessation and monitor for severe signs of withdrawal.  He will be discharged home.  SW consulted to arrange follow up [facility].     Consults:         Pending Diagnostic Studies:     None        Final Active Diagnoses:    Diagnosis Date Noted POA    PRINCIPAL PROBLEM:  Alcohol withdrawal [F10.239] 05/04/2017 Yes    Alcohol abuse [F10.10] 05/04/2017 Yes     Chronic    Elevated LFTs [R94.5] 05/04/2017 Yes    Smoker [F17.200] 05/04/2017 Yes     Chronic      Problems Resolved During this Admission:    Diagnosis Date Noted Date Resolved POA      No new Assessment & Plan notes have been filed under this hospital service since the last note was generated.  Service: Hospital Medicine      Discharged Condition: stable    Disposition: Home or Self Care    Follow Up:    Patient Instructions:     Diet general     Activity as tolerated     Call MD for:  temperature >100.4     Call MD for:  persistent nausea and vomiting or diarrhea     Call MD for:  difficulty breathing or increased cough     Call MD for:  persistent dizziness, light-headedness, or visual disturbances     Call MD for:  increased confusion or weakness       Medications:  Reconciled Home Medications:   Current Discharge Medication List      START taking these medications    Details   diazePAM (VALIUM) 10 MG Tab Take 1 tab tid for 2 days, then 1 tab bid for 3 days, then 1 tab daily for 3 days, then 1/2 tab for 2 days, then stop.  Qty: 16 tablet, Refills: 0      ondansetron (ZOFRAN-ODT) 8 MG TbDL Take 1 tablet (8 mg total) by mouth every 12 (twelve) hours as needed.  Qty: 15 tablet, Refills: 0      tramadol (ULTRAM) 50 mg tablet Take 1 tablet (50 mg total) by mouth every 6 (six) hours as needed.  Qty: 15 tablet, Refills: 0           Time spent on the discharge of patient:  >30 minutes    Mono Vizcarra MD  Department of Hospital Medicine  [facility]

## 2017-05-05 NOTE — PROGRESS NOTES
RITA emailed SABA Plasencia and Natasha RN with [facility] to refer patient for follow-up appointment with [facility]. RITA reported patient will be discharged today. RITA met with patient to provide him a list of in network residential substance abuse treatment facilities. Patient informed RITA he heard from an admissions counselors with one of the facilities, he could not remember the name; patient informed RITA he was told of bed availability in [place] and [place]. Patient stated the facility asking for $3000 up front deductible before he can receive treatment. RITA informed patient his other option would be using his medicaid and going to other facilities such as [facility], [facility], and [facility]. Patient informed RITA he was familiar with all of those places and the admissions process.

## 2017-05-05 NOTE — NURSING
Pt has remained free from falls and/or signs of infection this shift. Pt has remained within his comfort level as well. Will continue to monitor.

## 2017-05-05 NOTE — DISCHARGE INSTRUCTIONS
"    Alcohol Abuse  Alcoholic drinks are harmful when you have too many of them. There is no set number of drinks that defines too much. Drinking that disrupts your life or your health is called alcohol abuse. Alcohol abuse can hurt your relationships with others. You may lose friends, a spouse, or even your job. You may be abusing alcohol if any of the following are true for you:  · Duties at home or with  suffer because of drinking.  · Duties at work or in school suffer because of drinking.  · You have missed work or school because of drinking.  · You use alcohol while driving or operating machinery.  · You have legal problems such as arrests due to drinking.  · You keep drinking even though it causes serious problems in your life.  Health effects  Alcohol abuse causes health problems. Sometimes this can happen after only drinking a little." There is no set number of drinks or amount of alcohol that defines too much. The more you drink at one time, and the more often you drink determine both the short-term and long-term health effects. It affects all parts of your body and your health, including your:  · Brain. Alcohol is a central nervous system depressant. It can damage parts of the brain that affect your balance, memory, thinking, and emotions. It can cause memory loss, blackouts, depression, agitation, sleep cycle changes, and seizures. These changes may or may not be reversible.  · Heart and vascular system. Alcohol affects multiple areas. It can damage heart muscle causing cardiomyopathy, which is a weakening and stretching of the heart muscle. This can lead to trouble breathing, an irregular heartbeat, atrial fibrillation, leg swelling, and heart failure. Alcohol use makes the blood vessels stiffen causing high blood pressure. All of these problems increase your risk of having heart attacks or strokes.  · Liver. Alcohol causes fat to build up in the liver, affecting its normal function. This " increases the risk for hepatitis, leading to abdominal pain, appetite loss, jaundice, bleeding problems, liver fibrosis, and cirrhosis. This, in turn, can affect your ability to fight off infections, and can cause diabetes. The liver changes prevent it from removing toxins in your blood that can cause encephalopathy, which may show with confusion, altered level of consciousness, personality changes, memory loss, seizures, coma, and death.  · Pancreas. Alcohol can cause inflammation of the pancreas, or pancreatitis. This can cause abdominal pain, fever, and diabetes.  · Immune system. Alcohol weakens your immune system in a number of ways. It suppresses your immune system making it harder to fight infections and colds. It also increases the chance of getting pneumonia and tuberculosis.  · Cancer. Alcohol is a risk factor for developing cancer of the mouth, esophagus, pharynx, larynx, liver, and breast.  · Sexual function. Alcohol can lead to sexual problems.  Home care  The following guidelines will help you deal with alcohol abuse:  · Admit you have a problem with alcohol.  · Ask for help from your healthcare provider and trusted family members or close friends.  · Get help from people trained in dealing with alcohol abuse. This may be individual counseling or group therapy, or it may be a supervised alcohol treatment program.  · Join a self-help group for alcohol abuse such as Alcoholics Anonymous (AA).  · Avoid people who abuse alcohol or tempt you to drink.  Follow-up care  Follow up as advised by the healthcare provider, or as advised. Contact these groups to get help:  · Alcoholics Anonymous (AA): Go to www.aa.org or check the phone book for meetings near you.  · National Alcohol and Substance Abuse Information Center (NASAIC): 372.266.3813 www.addictioncareoptions.com  · National [place] on Alcoholism and Drug Dependence (NCADD): 255-FMD-NHYY (476-7814) www.ncadd.org  Call 911  Call 911 if any of these  occur:  · Trouble breathing or slow irregular breathing  · Chest pain  · Sudden weakness on one side of your body or sudden trouble speaking  · Heavy bleeding or vomiting blood  · Very drowsy or trouble awakening  · Fainting or loss of consciousness  · Rapid heart rate  · Seizure  When to seek medical care  Call your healthcare provider right away if any of these occur:   · Confusion  · Hallucinations (seeing, hearing, or feeling things that arent there)  · Pain in your upper abdomen that gets worse  · Repeated vomiting or black or tarry stools  · Severe shakiness  Date Last Reviewed: 6/1/2016  © 3211-1078 [employer]. [address] [place] [address]. All rights reserved. This information is not intended as a substitute for professional medical care. Always follow your healthcare professional's instructions.

## 2017-05-05 NOTE — NURSING
IV discontinued. Discharge instructions given to patient who verbalized an understanding of his discharge instructions and how to take his new medications. Informed him that the AA hotline is available if he needs extra support at home. Patient discharged.

## 2017-05-09 ENCOUNTER — PATIENT OUTREACH (OUTPATIENT)
Dept: ADMINISTRATIVE | Facility: CLINIC | Age: 32
End: 2017-05-09
Payer: MEDICAID

## 2017-05-09 NOTE — Clinical Note
Please forward this important TCC information to your provider in order to maximize the post discharge care delivery of this patient.  C3 nurse spoke with Beau Leahy  for a TCC post hospital discharge follow up call. The patient has a scheduled HOSFU appointment with ASHLEY LEON on 5/11 @ 1600. It would be beneficial to remind him as he said it would be a $25 copay but reporting he does have money.  Thanking you in advance Respectfully, Simin Quiroga RN  [facility]   [email or web address]     Please do not reply to this message, as this inbox is not routinely monitored.

## 2017-05-09 NOTE — PATIENT INSTRUCTIONS
Alcohol Abuse  Alcoholic drinks are harmful when you have too many of them. Drinking that disrupts your life is called alcohol abuse. Alcohol abuse can hurt your relationships with others. You may lose friends, a spouse, or even your job. You may be abusing alcohol if any of the following are true for you:  Duties at home or with  suffer because of drinking.  Duties at work or in school suffer because of drinking.  You have missed work or school because of drinking.  You use alcohol while driving or operating machinery.  You have legal problems such as arrests due to drinking.  You keep drinking even though it causes serious problems in your life.  Alcohol abuse can cause health problems. Too much alcohol can cause disease of the liver and pancreas. It can damage your brain and your heart. It raises your risk of cancer. It can lead to sexual problems and make it hard to conceive children. Alcohol abuse in pregnancy can hurt the baby. Alcohol affects how you think and respond. You are more likely to die in an accident or fire if you have been drinking.  Home Care  Admit you have a problem with alcohol.  Ask for help from your healthcare provider as well as trusted family members or close friends.  Get help from people trained in dealing with alcohol abuse. This may be individual counseling or group therapy, or it may be a supervised alcohol treatment program.  Join a self-help group for alcohol abuse such as Alcoholics Anonymous (AA).  Avoid people who abuse alcohol or tempt you to drink.  Follow Up  as advised by the doctor or our staff. Contact these groups to get help:  Alcoholics Anonymous (AA): Go to www.aa.org or check the phone book for meetings near you.  National Alcohol and Substance Abuse Information Center (NASAIC): 392.892.4760 www.addictioncareoptions.com  National [place] on Alcoholism and Drug Dependence (NCADD): 653-JYL-HZTC (133-0513) www.ncadd.org  Al-Anon: 332-3RZ-RJSL (662-4824)  www.al-anon.org  Get Prompt Medical Attention  if you have:  Confusion  Hallucinations (seeing, hearing, or feeling things that arent there)  xtreme droEwsiness or inability to awaken  Increasing upper abdominal pain  Repeated vomiting, vomiting blood, or black or tarry stools  Severe shakiness or fast heart rate  Seizure (convulsion)  © 9934-6400 [employer], [address] [place] [address], [place] [address]. All rights reserved. This information is not intended as a substitute for professional medical care. Always follow your healthcare professional's instructions.

## 2017-05-09 NOTE — PROGRESS NOTES
C3 nurse attempted to contact patient. No answer. The following message was left for the patient to return the call:  Good afternoon  I am a nurse calling on behalf of [facility] from the [facility].  This is a Transitional Care Call for Beau Leahy . When you have a moment please contact us at [phone] or [phone] Monday through Friday, between the hours of 8 am to 4 pm. We look forward to speaking with you. On behalf of [facility] have a nice day.    The patient has a scheduled HOSFU appointment with ASHLEY LEON on 5/11 @ 1600. Message sent to Physician staff.

## 2017-05-09 NOTE — Clinical Note
Please forward this important TCC information to your provider in order to maximize the post discharge care delivery of this patient.  C3 nurse attempted to contact Beau Leahy  for a TCC post hospital discharge follow up call. No answer. C3 nurse left a voice message and OOC # given. The patient has a scheduled HOSFU appointment with ASHLEY LEON on 5/11 @ 1600.  Respectfully, Simni Quiroga, RN  [facility]  [email or web address]  Please do not reply to this message, as this inbox is not routinely monitored.